# Patient Record
Sex: FEMALE | Race: WHITE | URBAN - METROPOLITAN AREA
[De-identification: names, ages, dates, MRNs, and addresses within clinical notes are randomized per-mention and may not be internally consistent; named-entity substitution may affect disease eponyms.]

---

## 2018-02-15 ENCOUNTER — APPOINTMENT (RX ONLY)
Dept: RURAL CLINIC 4 | Facility: CLINIC | Age: 48
Setting detail: DERMATOLOGY
End: 2018-02-15

## 2018-02-15 DIAGNOSIS — D485 NEOPLASM OF UNCERTAIN BEHAVIOR OF SKIN: ICD-10-CM

## 2018-02-15 DIAGNOSIS — L57.8 OTHER SKIN CHANGES DUE TO CHRONIC EXPOSURE TO NONIONIZING RADIATION: ICD-10-CM

## 2018-02-15 PROBLEM — E78.5 HYPERLIPIDEMIA, UNSPECIFIED: Status: ACTIVE | Noted: 2018-02-15

## 2018-02-15 PROBLEM — F32.9 MAJOR DEPRESSIVE DISORDER, SINGLE EPISODE, UNSPECIFIED: Status: ACTIVE | Noted: 2018-02-15

## 2018-02-15 PROBLEM — L23.7 ALLERGIC CONTACT DERMATITIS DUE TO PLANTS, EXCEPT FOOD: Status: ACTIVE | Noted: 2018-02-15

## 2018-02-15 PROBLEM — L85.3 XEROSIS CUTIS: Status: ACTIVE | Noted: 2018-02-15

## 2018-02-15 PROBLEM — D48.5 NEOPLASM OF UNCERTAIN BEHAVIOR OF SKIN: Status: ACTIVE | Noted: 2018-02-15

## 2018-02-15 PROBLEM — I10 ESSENTIAL (PRIMARY) HYPERTENSION: Status: ACTIVE | Noted: 2018-02-15

## 2018-02-15 PROCEDURE — ? COUNSELING

## 2018-02-15 PROCEDURE — 99202 OFFICE O/P NEW SF 15 MIN: CPT | Mod: 25

## 2018-02-15 PROCEDURE — 11100: CPT

## 2018-02-15 PROCEDURE — ? PRESCRIPTION

## 2018-02-15 PROCEDURE — ? BIOPSY BY SHAVE METHOD

## 2018-02-15 RX ORDER — KETOCONAZOLE 20.5 MG/ML
SHAMPOO, SUSPENSION TOPICAL
Qty: 1 | Refills: 3 | Status: ERX | COMMUNITY
Start: 2018-02-15

## 2018-02-15 RX ADMIN — KETOCONAZOLE: 20.5 SHAMPOO, SUSPENSION TOPICAL at 13:30

## 2018-02-15 ASSESSMENT — LOCATION DETAILED DESCRIPTION DERM
LOCATION DETAILED: SUPERIOR LUMBAR SPINE
LOCATION DETAILED: LEFT MEDIAL FOREHEAD

## 2018-02-15 ASSESSMENT — LOCATION SIMPLE DESCRIPTION DERM
LOCATION SIMPLE: LOWER BACK
LOCATION SIMPLE: LEFT FOREHEAD

## 2018-02-15 ASSESSMENT — LOCATION ZONE DERM
LOCATION ZONE: FACE
LOCATION ZONE: TRUNK

## 2018-02-15 NOTE — PROCEDURE: BIOPSY BY SHAVE METHOD
Biopsy Type: H and E
X Size Of Lesion In Cm: 0
Hemostasis: Storm's
Electrodesiccation And Curettage Text: The wound bed was treated with electrodesiccation and curettage after the biopsy was performed.
Cryotherapy Text: The wound bed was treated with cryotherapy after the biopsy was performed.
Silver Nitrate Text: The wound bed was treated with silver nitrate after the biopsy was performed.
Bill For Surgical Tray: no
Post-Care Instructions: I reviewed with the patient in detail post-care instructions. Patient is to keep the biopsy site dry overnight, and then apply vaseline daily until healed.
Anesthesia Type: 1% lidocaine without epinephrine
Lab: [address] [place] [address]
Consent: Written consent was obtained and risks were reviewed including but not limited to scarring, infection, bleeding, scabbing, incomplete removal, nerve damage and allergy to anesthesia.
Body Location Override (Optional - Billing Will Still Be Based On Selected Body Map Location If Applicable): mid back
Detail Level: Simple
Render Post-Care Instructions In Note?: yes
Type Of Destruction Used: Curettage
Size Of Lesion In Cm: 0.5
Notification Instructions: Patient will be notified of biopsy results. However, patient instructed to call the office if not contacted within 2 weeks.
Billing Type: [employer]
Anesthesia Volume In Cc (Will Not Render If 0): 0.3
Dressing: pressure dressing with telfa
Wound Care: Vaseline
Electrodesiccation Text: The wound bed was treated with electrodesiccation after the biopsy was performed.

## 2018-11-16 ENCOUNTER — NEW PATIENT COMPREHENSIVE (OUTPATIENT)
Dept: URBAN - METROPOLITAN AREA CLINIC 47 | Facility: CLINIC | Age: 48
End: 2018-11-16

## 2018-11-16 DIAGNOSIS — H52.203: ICD-10-CM

## 2018-11-16 DIAGNOSIS — H52.4: ICD-10-CM

## 2018-11-16 PROCEDURE — 92015 DETERMINE REFRACTIVE STATE: CPT

## 2018-11-16 PROCEDURE — 92004 COMPRE OPH EXAM NEW PT 1/>: CPT

## 2018-11-16 ASSESSMENT — VISUAL ACUITY
OS_SC: CF 5FT
OS_CC: 20/30
OD_CC: J1
OD_SC: J1
OS_CC: J1
OD_CC: 20/25-1
OS_SC: J1
OD_SC: CF 5FT

## 2018-11-16 ASSESSMENT — TONOMETRY
OD_IOP_MMHG: 15
OS_IOP_MMHG: 16

## 2021-03-03 NOTE — PATIENT DISCUSSION
PLAN CE IOL OD CV -2.00 WITH ISTENT THEN CE IOL OS CV JEFF. . Get approval for istent prior to scheduling.

## 2021-03-16 NOTE — PATIENT DISCUSSION
Patient advised of the right to post-operative care by the surgeon. Patient is fully informed of, and agreed to, co-management with their primary optometric physician. Post-operative care by the surgeon is not medically necessary and co-management is clinically appropriate. Patient has received itemization of fees related to cataract surgery. Transfer of care letter completed for the patient. Transfer care of OD eye to Dr. Juan Collado on 3/16/21. Patient instructed to call immediately if any new distortion, blurring, decreased vision or eye pain.

## 2021-03-16 NOTE — PATIENT DISCUSSION
Patient advised of the right to post-operative care by the surgeon. Patient is fully informed of, and agreed to, co-management with their primary optometric physician. Post-operative care by the surgeon is not medically necessary and co-management is clinically appropriate. Patient has received itemization of fees related to cataract surgery. Transfer of care letter completed for the patient. Transfer care of OD eye to Dr. Efe Love on 3/16/21. Patient instructed to call immediately if any new distortion, blurring, decreased vision or eye pain.

## 2021-03-22 NOTE — PATIENT DISCUSSION
Patient advised of the right to post-operative care by the surgeon. Patient is fully informed of, and agreed to, co-management with their primary optometric physician. Post-operative care by the surgeon is not medically necessary and co-management is clinically appropriate. Patient has received itemization of fees related to cataract surgery. Transfer of care letter completed for the patient. Transfer care of right eye to Dr. Satnam Heller on 3/22/21. Patient instructed to call immediately if any new distortion, blurring, decreased vision or eye pain.

## 2021-03-22 NOTE — PATIENT DISCUSSION
Patient advised of the right to post-operative care by the surgeon. Patient is fully informed of, and agreed to, co-management with their primary optometric physician. Post-operative care by the surgeon is not medically necessary and co-management is clinically appropriate. Patient has received itemization of fees related to cataract surgery. Transfer of care letter completed for the patient. Transfer care of OD eye to Dr. Nereida Piedra on 3/16/21. Patient instructed to call immediately if any new distortion, blurring, decreased vision or eye pain.

## 2021-03-22 NOTE — PATIENT DISCUSSION
Patient advised of the right to post-operative care by the surgeon. Patient is fully informed of, and agreed to, co-management with their primary optometric physician. Post-operative care by the surgeon is not medically necessary and co-management is clinically appropriate. Patient has received itemization of fees related to cataract surgery. Transfer of care letter completed for the patient. Transfer care of OD eye to Dr. Tracey Angel on 3/16/21. Patient instructed to call immediately if any new distortion, blurring, decreased vision or eye pain.

## 2024-10-01 ENCOUNTER — NEW PATIENT (OUTPATIENT)
Dept: URBAN - METROPOLITAN AREA CLINIC 47 | Facility: CLINIC | Age: 54
End: 2024-10-01

## 2024-10-01 DIAGNOSIS — H04.123: ICD-10-CM

## 2024-10-01 DIAGNOSIS — H52.223: ICD-10-CM

## 2024-10-01 DIAGNOSIS — H44.23: ICD-10-CM

## 2024-10-01 DIAGNOSIS — H52.4: ICD-10-CM

## 2024-10-01 PROCEDURE — 92015 DETERMINE REFRACTIVE STATE: CPT

## 2024-10-01 PROCEDURE — 92004 COMPRE OPH EXAM NEW PT 1/>: CPT
